# Patient Record
Sex: MALE | Race: OTHER | Employment: OTHER | URBAN - METROPOLITAN AREA
[De-identification: names, ages, dates, MRNs, and addresses within clinical notes are randomized per-mention and may not be internally consistent; named-entity substitution may affect disease eponyms.]

---

## 2020-05-12 ENCOUNTER — OFFICE VISIT (OUTPATIENT)
Dept: PRIMARY CARE CLINIC | Age: 50
End: 2020-05-12

## 2020-05-12 PROCEDURE — 99213 OFFICE O/P EST LOW 20 MIN: CPT | Performed by: NURSE PRACTITIONER

## 2020-05-12 NOTE — PROGRESS NOTES
FLU/COVID-19 CLINIC EVALUATION  HPI:  Symptom duration, days:  [] 1   [] 2    []3   [] 4    []5    []6   [] 7    []8    []9   [] 10    []11 []  12   [] 13    []14 or greater    There were no vitals filed for this visit. Review of Systems  Symptom course:  []Worsening        []Stable       [] Improving  []Fevers  Symptom (not measured)  Measured (Result: )  []Chills  [x]Cough  [x]Coughing up blood  []Chest Congestion  []Nasal Congestion  []Feeling short of breath  []Sometimes  []Frequently  []All the time  []Chest pain  []Headaches  []Tolerable  []Severe  []Sore throat  []Muscle aches  []Nausea  []Vomiting  []Unable to keep fluids down  [x]Diarrhea  []Severe  OTHER SYMPTOMS:      RISK FACTORS:  []Pregnant or possibly pregnant  []Age over 60  []Diabetes  []Heart disease  []Asthma  []COPD/Other chronic lung diseases  []Active Cancer  []On Chemotherapy  []Taking oral steroids  []History Lymphoma/Leukemia  [x]Close contact with a lab confirmed COVID-19 patient within 14 days of symptom onset  []History of travel from affected geographical areas within 14 days of symptom onset  []Health Care Worker Exposure no symptoms  []Health Care Worker Exposure symptomatic    Assessment :  [x]Alert      [x]Oriented to person/place/time  [x]No apparent distress      []Toxic appearing  [x]Breathing appears normal   []Appears tachypneic      [x]Speaking in full sentence    Physical Exam     Test ordered:    [x]COVID    _________  []Strep    ___________  []Flu       _________    Diagnosis:     []Flu  []Strep Throat  []Uncertain Viral Respiratory Illness  []Possible COVID-19  [x]Exposure COVID-19  []Other    PLAN:  []Referred to emergency department/ respiratory dept.   for evaluation      Discharge home with verbal and or written instructions for:  []Preventing the spread of Coronavirus   []Flu management  []Strep throat management  [x]Possible COVID-19 exposure with self-quarantine, isolation instructions and management of symptoms  [x]Follow-up with primary care physician or emergency department if worsens  [x]Evaluation per physician/nurse practitioner in clinic      1. Suspected COVID-19 virus infection  COVID-19 swab complete at clinic and sent to lab for testing.  Quarantine order in place, patient verbalized understanding.     - Covid-19 Ambulatory

## 2020-05-14 LAB
SARS-COV-2: DETECTED
SOURCE: ABNORMAL

## 2022-05-27 ENCOUNTER — HOSPITAL ENCOUNTER (EMERGENCY)
Age: 52
Discharge: HOME | End: 2022-05-27
Attending: STUDENT IN AN ORGANIZED HEALTH CARE EDUCATION/TRAINING PROGRAM
Payer: COMMERCIAL

## 2022-05-27 ENCOUNTER — APPOINTMENT (OUTPATIENT)
Dept: GENERAL RADIOLOGY | Age: 52
End: 2022-05-27
Payer: COMMERCIAL

## 2022-05-27 VITALS
SYSTOLIC BLOOD PRESSURE: 137 MMHG | DIASTOLIC BLOOD PRESSURE: 86 MMHG | HEART RATE: 75 BPM | TEMPERATURE: 99.1 F | OXYGEN SATURATION: 98 % | RESPIRATION RATE: 18 BRPM

## 2022-05-27 DIAGNOSIS — G51.0 BELL'S PALSY: Primary | ICD-10-CM

## 2022-05-27 DIAGNOSIS — H61.22 IMPACTED CERUMEN OF LEFT EAR: ICD-10-CM

## 2022-05-27 PROBLEM — R97.20 INCREASED PROSTATE SPECIFIC ANTIGEN (PSA) VELOCITY: Status: ACTIVE | Noted: 2018-11-27

## 2022-05-27 LAB
ANION GAP SERPL CALCULATED.3IONS-SCNC: 10 MMOL/L (ref 3–16)
BASOPHILS ABSOLUTE: 0 K/UL (ref 0–0.2)
BASOPHILS RELATIVE PERCENT: 0.5 %
BUN BLDV-MCNC: 15 MG/DL (ref 7–20)
CALCIUM SERPL-MCNC: 8.7 MG/DL (ref 8.3–10.6)
CHLORIDE BLD-SCNC: 101 MMOL/L (ref 99–110)
CO2: 27 MMOL/L (ref 21–32)
CREAT SERPL-MCNC: 1 MG/DL (ref 0.9–1.3)
EOSINOPHILS ABSOLUTE: 0.1 K/UL (ref 0–0.6)
EOSINOPHILS RELATIVE PERCENT: 1.6 %
GFR AFRICAN AMERICAN: >60
GFR NON-AFRICAN AMERICAN: >60
GLUCOSE BLD-MCNC: 91 MG/DL (ref 70–99)
GLUCOSE BLD-MCNC: 96 MG/DL (ref 70–99)
HCT VFR BLD CALC: 45.7 % (ref 40.5–52.5)
HEMOGLOBIN: 16 G/DL (ref 13.5–17.5)
LYMPHOCYTES ABSOLUTE: 2.9 K/UL (ref 1–5.1)
LYMPHOCYTES RELATIVE PERCENT: 34.6 %
MCH RBC QN AUTO: 31.4 PG (ref 26–34)
MCHC RBC AUTO-ENTMCNC: 35.1 G/DL (ref 31–36)
MCV RBC AUTO: 89.5 FL (ref 80–100)
MONOCYTES ABSOLUTE: 0.5 K/UL (ref 0–1.3)
MONOCYTES RELATIVE PERCENT: 6.2 %
NEUTROPHILS ABSOLUTE: 4.9 K/UL (ref 1.7–7.7)
NEUTROPHILS RELATIVE PERCENT: 57.1 %
PDW BLD-RTO: 13.2 % (ref 12.4–15.4)
PERFORMED ON: NORMAL
PLATELET # BLD: 280 K/UL (ref 135–450)
PMV BLD AUTO: 10.3 FL (ref 5–10.5)
POTASSIUM SERPL-SCNC: 3.3 MMOL/L (ref 3.5–5.1)
RBC # BLD: 5.1 M/UL (ref 4.2–5.9)
SODIUM BLD-SCNC: 138 MMOL/L (ref 136–145)
WBC # BLD: 8.5 K/UL (ref 4–11)

## 2022-05-27 PROCEDURE — 36415 COLL VENOUS BLD VENIPUNCTURE: CPT

## 2022-05-27 PROCEDURE — 69209 REMOVE IMPACTED EAR WAX UNI: CPT

## 2022-05-27 PROCEDURE — 71045 X-RAY EXAM CHEST 1 VIEW: CPT

## 2022-05-27 PROCEDURE — 93005 ELECTROCARDIOGRAM TRACING: CPT | Performed by: STUDENT IN AN ORGANIZED HEALTH CARE EDUCATION/TRAINING PROGRAM

## 2022-05-27 PROCEDURE — 80048 BASIC METABOLIC PNL TOTAL CA: CPT

## 2022-05-27 PROCEDURE — 85025 COMPLETE CBC W/AUTO DIFF WBC: CPT

## 2022-05-27 PROCEDURE — 6370000000 HC RX 637 (ALT 250 FOR IP): Performed by: STUDENT IN AN ORGANIZED HEALTH CARE EDUCATION/TRAINING PROGRAM

## 2022-05-27 PROCEDURE — 99285 EMERGENCY DEPT VISIT HI MDM: CPT

## 2022-05-27 RX ORDER — PHENTERMINE HYDROCHLORIDE 37.5 MG/1
37.5 TABLET ORAL
COMMUNITY
Start: 2022-05-13 | End: 2022-06-12

## 2022-05-27 RX ORDER — DUTASTERIDE 0.5 MG/1
CAPSULE, LIQUID FILLED ORAL
COMMUNITY
Start: 2022-05-05

## 2022-05-27 RX ORDER — AMOXICILLIN 875 MG/1
TABLET, COATED ORAL
COMMUNITY
Start: 2022-05-16 | End: 2022-05-27

## 2022-05-27 RX ORDER — LOVASTATIN 40 MG/1
TABLET ORAL
COMMUNITY
Start: 2022-05-15

## 2022-05-27 RX ORDER — DICLOFENAC SODIUM 75 MG/1
TABLET, DELAYED RELEASE ORAL
COMMUNITY
Start: 2022-05-13 | End: 2022-05-27

## 2022-05-27 RX ORDER — TADALAFIL 5 MG/1
5 TABLET ORAL DAILY PRN
COMMUNITY
Start: 2021-02-05

## 2022-05-27 RX ORDER — PREDNISONE 10 MG/1
TABLET ORAL
Qty: 15 TABLET | Refills: 0 | Status: SHIPPED | OUTPATIENT
Start: 2022-06-02 | End: 2022-06-07

## 2022-05-27 RX ORDER — PREDNISONE 20 MG/1
TABLET ORAL
Qty: 15 TABLET | Refills: 0 | Status: SHIPPED | OUTPATIENT
Start: 2022-05-28 | End: 2022-06-02

## 2022-05-27 RX ORDER — LOSARTAN POTASSIUM AND HYDROCHLOROTHIAZIDE 25; 100 MG/1; MG/1
TABLET ORAL
COMMUNITY
Start: 2022-05-15

## 2022-05-27 RX ORDER — AMLODIPINE BESYLATE 2.5 MG/1
2.5 TABLET ORAL DAILY
COMMUNITY
Start: 2022-01-12

## 2022-05-27 RX ORDER — PREDNISONE 20 MG/1
60 TABLET ORAL ONCE
Status: COMPLETED | OUTPATIENT
Start: 2022-05-27 | End: 2022-05-27

## 2022-05-27 RX ADMIN — PREDNISONE 60 MG: 20 TABLET ORAL at 22:06

## 2022-05-27 ASSESSMENT — PAIN - FUNCTIONAL ASSESSMENT: PAIN_FUNCTIONAL_ASSESSMENT: NONE - DENIES PAIN

## 2022-05-27 NOTE — ED PROVIDER NOTES
[address] [facility]          ATTENDING PHYSICIAN NOTE       Date of evaluation: 5/27/2022    Chief Complaint     Chief Complaint   Patient presents with    Facial Droop    Numbness         History of Present Illness     HPI   Winter Brown is a 45 y.o. male with a past medical history of HTN, HLD, who presents to the ED for evaluation of acute onset left-sided facial weakness. He reports that he had difficulty sleeping last night, and at 1 point he woke up around 0300 hrs., though says at that time he felt fine/normal.  When he did get up this morning, several hours later, he noticed a left-sided facial droop. He states that he went to [facility] [address] [place] [address] [facility] in [facility] this morning and says he was evaluated by somebody there and they told him that he needed to go to the ER for further evaluation. He denies any chest pain or palpitations, abdominal pain, nausea, vomiting, diarrhea. He denies headache or vision changes. He also denies any balance or gait issues. He does report being put on amoxicillin last week for a periodontal infection for the left upper third molar and is actually scheduled to see the dentist for tooth extraction this coming Thursday. He denies any itching or painful rash or lesions in his hair, neck or along any area of his body. He denies any prior similar symptoms to what he is experiencing today. He has had shingles illness/infection he said years ago. Febrile illness. He denies any vision changes including double vision or blurred vision. Review of Systems     Review of Systems  All other ROS negative except as indicated above in HPI. Past Medical, Surgical, Family, and Social History     Past Medical History:   Diagnosis Date    Hyperlipidemia     Hypertension        Past Surgical History:   Procedure Laterality Date    SHOULDER SURGERY      VASECTOMY          History reviewed. No pertinent family history. Social History     Tobacco Use    Smoking status: Never Smoker    Smokeless tobacco: Not on file   Substance Use Topics    Alcohol use: No    Drug use: Not on file          Medications     Prior to Admission medications    Medication Sig Start Date End Date Taking? Authorizing Provider   amLODIPine (NORVASC) 2.5 MG tablet Take 2.5 mg by mouth daily 1/12/22  Yes Historical Provider, MD   tadalafil (CIALIS) 5 MG tablet Take 5 mg by mouth daily as needed 2/5/21  Yes Historical Provider, MD   phentermine (ADIPEX-P) 37.5 MG tablet Take 37.5 mg by mouth every morning (before breakfast). 5/13/22 6/12/22 Yes Historical Provider, MD   predniSONE (DELTASONE) 20 MG tablet Take 2 tablets by mouth every morning AND 1 tablet Daily with lunch. Do all this for 5 days. 5/28/22 6/2/22 Yes Candido Hernandez MD   predniSONE (DELTASONE) 10 MG tablet Take 5 tablets by mouth daily for 1 day, THEN 4 tablets daily for 1 day, THEN 3 tablets daily for 1 day, THEN 2 tablets daily for 1 day, THEN 1 tablet daily for 1 day. 6/2/22 6/7/22 Yes Candido Hernandez MD   dutasteride (AVODART) 0.5 mg capsule  5/5/22   Historical Provider, MD   losartan-hydroCHLOROthiazide [facility]) 100-25 MG per tablet  5/15/22   Historical Provider, MD   lovastatin (MEVACOR) 40 MG tablet  5/15/22   Historical Provider, MD   diclofenac (VOLTAREN) 75 MG EC tablet  5/13/22 5/27/22  Historical Provider, MD   lisinopril (PRINIVIL;ZESTRIL) 10 MG tablet Take 10 mg by mouth daily.     Historical Provider, MD       Allergies     Allergies as of 05/27/2022    (No Known Allergies)        Physical Exam     ED Triage Vitals [05/27/22 1837]   Enc Vitals Group      BP (!) 162/88      Heart Rate 75      Resp 18      Temp 99.1 °F (37.3 °C)      Temp Source Oral      SpO2 96 %       Physical Exam  GENERAL: 45y.o. year old male  Appears well-developed, well-nourished, in no obvious acute distress  Nursing notes reviewed  Vital signs reviewed   HEAD/FACE:  Normocephalic, atraumatic  Left sided facial and forehead weakness    EYES:  Pupils are equal, round, reactive to light and are 3mm bilaterally   EOM intact  Conjunctivae normal, anicteric    ENT: External ears normal and notably without any evidence of rash or vesicular lesions  Right external ear canal patent and middle ear is translucent. Left external ear canal is 90% occluded with hardened cerumen, midway into the canal > see cerumen disimpaction procedure below > once the cerumen impaction was relieved I was able to visualize the entirety of the left middle ear which appeared healthy and translucent without any evidence of vesicles. Nose normal  Oropharynx is clear and oral mucosa is moist   CV: Regular rate and rhythm     PULMONARY:  Symmetric chest wall expansion  Effort normal  No accessory muscle use or other signs of respiratory distress  Bilateral breath sounds are clear and equal, no wheezes, rhonchi or rales   ABDOMINAL: Soft  Non-tender  Non-distended  No peritonitis  No guarding, rebound, or rigidity   MSK:  Freely moves all extremities without difficulty  Atraumatic   SKIN: Warm, pink and dry  No obvious rashes, acute lesions or open wounds   NEURO: Alert and oriented x4  EYES: Spontaneously (4), VERBAL: Oriented (5), MOTOR: Obeys commands (6), GCS TOTAL: 15    MENTAL STATUS EXAM:  Awake and alert; oriented to person, place, and time. Cooperative and follows commands well. Speech is clear and language is normal.  Concentration, memory, and fund of knowledge are normal.    CRANIAL NERVES:  CN 2 (Optic): Visual fields intact to confrontation. CN 3,4,6 (EOM): Pupils equal and reactive to light. Full extraocular eye movement without nystagmus. CN 5 (Trigeminal): Facial sensation is intact to light touch  CN 7 (Facial): LEFT-sided facial droop & includes left forehead  CN 8 (Auditory): Auditory acuity grossly normal.   CN 9, 10 (Glossopharyngeal): The uvula is midline, the palate elevates symmetrically.   CN 11 (spinal access): head turn (sternocleidomastoid) and shoulder shrug (trapezius) were normal  CN 12 (Hypoglossal): The tongue is midline. No atrophy or fasciculations. MOTOR:  normal 5/5 strength in all tested muscle groups no muscle wasting or atrophy no fasciculations noted no involuntary movements    REFLEXES:  clonus is absent    COORDINATION:  Intact and finger-to-nose,    SENSATION:  Intact to light touch    GAIT:  Ambulates without assistance and without difficulty. Normal balance       PSYCH: Calm and cooperative. Mood and affect normal         Diagnostic Results     EKG   Interpreted by: Rupa Bear. Sriram Rod MD    The EKG from this visit has been reviewed, and is notable for:   Normal sinus rhythm without ectopy, with a ventricular rate of 69, and normal axis.  Normal intervals (MD: 152ms, QRS 108ms, QTc 445ms).  No overt evidence of acute ischemia.  There are no overt repolarization abnormalities present.  No prior EKG on file in our records for direct comparison    RADIOLOGY:  XR CHEST PORTABLE   Final Result      Mild left lower lobe linear atelectasis.              LABS:   Results for orders placed or performed during the hospital encounter of 05/27/22   CBC with Auto Differential   Result Value Ref Range    WBC 8.5 4.0 - 11.0 K/uL    RBC 5.10 4.20 - 5.90 M/uL    Hemoglobin 16.0 13.5 - 17.5 g/dL    Hematocrit 45.7 40.5 - 52.5 %    MCV 89.5 80.0 - 100.0 fL    MCH 31.4 26.0 - 34.0 pg    MCHC 35.1 31.0 - 36.0 g/dL    RDW 13.2 12.4 - 15.4 %    Platelets 604 166 - 748 K/uL    MPV 10.3 5.0 - 10.5 fL    Neutrophils % 57.1 %    Lymphocytes % 34.6 %    Monocytes % 6.2 %    Eosinophils % 1.6 %    Basophils % 0.5 %    Neutrophils Absolute 4.9 1.7 - 7.7 K/uL    Lymphocytes Absolute 2.9 1.0 - 5.1 K/uL    Monocytes Absolute 0.5 0.0 - 1.3 K/uL    Eosinophils Absolute 0.1 0.0 - 0.6 K/uL    Basophils Absolute 0.0 0.0 - 0.2 K/uL   Basic Metabolic Panel   Result Value Ref Range    Sodium 138 136 - 145 mmol/L Potassium 3.3 (L) 3.5 - 5.1 mmol/L    Chloride 101 99 - 110 mmol/L    CO2 27 21 - 32 mmol/L    Anion Gap 10 3 - 16    Glucose 96 70 - 99 mg/dL    BUN 15 7 - 20 mg/dL    CREATININE 1.0 0.9 - 1.3 mg/dL    GFR Non-African American >60 >60    GFR African American >60 >60    Calcium 8.7 8.3 - 10.6 mg/dL   POCT Glucose   Result Value Ref Range    POC Glucose 91 70 - 99 mg/dl    Performed on ACCU-CHEK        RECENT VITALS:  BP: 137/86,Temp: 99.1 °F (37.3 °C), Heart Rate: 75, Resp: 18, SpO2: 98 %     Procedures     ED BEDSIDE ULTRASOUND:  none    Ear Wax Removal    Date/Time: 5/27/2022 10:30 PM  Performed by: Alejandra Mg MD  Authorized by: Alejandra Mg MD     Consent:     Consent obtained:  Verbal    Consent given by:  Patient    Risks discussed:  Pain, TM perforation, incomplete removal, dizziness and bleeding    Alternatives discussed:  No treatment, delayed treatment, observation and alternative treatment  Procedure details:     Location:  L ear    Procedure type: irrigation      Procedure type comment:  Irrigation with warm saline and water. Curette was also required to remove large, hardened cerumen ball  Post-procedure details: Inspection:  TM intact    Hearing quality:  Improved    Patient tolerance of procedure: Tolerated well, no immediate complications           ED Course          Key medications administered in the ED:  ED Medication Orders (From admission, onward)    Start Ordered     Status Ordering Provider    05/27/22 2200 05/27/22 2151  predniSONE (DELTASONE) tablet 60 mg  ONCE         Last MAR action: Given - by Jm Adkins on 05/27/22 at 2[address] A           CONSULTS:  None    MEDICAL DECISIONMAKING / ASSESSMENT / PLAN   I have reviewed and confirmed nursing documentation for the pertinent past medical history, family history, and social history.     Joss Escoto is a 45 y.o. male with PMHx as above, who presented to the ED today for evaluation of a left-sided facial weakness and subjective numbness without objective sensation change. Differential Dx: Facial nerve palsy, CVA, trigeminal neuralgia, tick paralysis, herpes zoster, CNS tumor    Additional ED Course, Interventions & Dispo:    Patient arrived afebrile and hemodynamically stable.  This physician arrived to the bedside after being notified by nursing staff that this patient was bedded and had acute neurologic findings. This physician arrived to the bedside to perform a neurologic examination. Determined that the neurologic findings consisted of subjective numbness without objective sensation change to the left-sided face and with left-sided facial nerve paralysis which does include the forehead, though he does have mild ability to raise his left eyebrow.  Evaluated some basic blood work with CBC and BMP for overt electrolyte abnormalities, as well as EKG and a chest x-ray. CBC is unremarkable. BMP has a mildly reduced serum potassium of 3.3. EKG is described above. Chest x-ray is also unrevealing and reassuring.  As described above, a cerumen impaction was disimpacted after attempting to evaluate the entirety of the left ear for any evidence of vesicles that would warrant antiviral coverage for Herpes zoster. There were none.  He was given the first dose of 60 mg oral prednisone here in the emergency department. His wife arrived to the bedside and I explained to the diagnoses, the treatment plan and recommendations. A prescription was sent to their preferred pharmacy for a remaining 5 days of 60 mg prednisone oral with specific instructions. Additionally, a 5-day taper of prednisone was also ordered with specific instructions. All questions have been answered to their satisfaction and he is stable for discharge to home at this time.    I did advise him strongly that I would like him to be reassessed by his PCP this week, that Monday is a holiday and will be Tuesday before they can contact the office to schedule, though advised he needs a close reassessment this week no later than Friday. At this time I believe no further emergent labs or imaging are indicated and the patient to be safe for discharge to home. They are in agreement with this plan, and are comfortable with discharge to home at this time. I discussed with them concerning signs and symptoms that would necessitate return to the emergency department. They voiced understanding of these instructions and had no further questions. Clinical Impression     1. Bell's palsy    2. Impacted cerumen of left ear          Disposition     DISPOSITION Decision To Discharge 05/27/2022 10:46:20 PM          PATIENT REFERRED TO:  Low Benavides MD  [address]  [facility] [address] [phone]    Call in 3 days  For Next available ED follow up visit. DISCHARGE MEDICATIONS:  Discharge Medication List as of 5/27/2022 11:05 PM      START taking these medications    Details   ! ! predniSONE (DELTASONE) 20 MG tablet Take 2 tablets by mouth every morning AND 1 tablet Daily with lunch. Do all this for 5 days. , Disp-15 tablet, R-0Normal      !! predniSONE (DELTASONE) 10 MG tablet Take 5 tablets by mouth daily for 1 day, THEN 4 tablets daily for 1 day, THEN 3 tablets daily for 1 day, THEN 2 tablets daily for 1 day, THEN 1 tablet daily for 1 day., Disp-15 tablet, R-0Normal       !! - Potential duplicate medications found. Please discuss with provider. CONTINUE these medications which have NOT CHANGED    Details   amLODIPine (NORVASC) 2.5 MG tablet Take 2.5 mg by mouth dailyHistorical Med      tadalafil (CIALIS) 5 MG tablet Take 5 mg by mouth daily as neededHistorical Med      phentermine (ADIPEX-P) 37.5 MG tablet Take 37.5 mg by mouth every morning (before breakfast). Historical Med      dutasteride (AVODART) 0.5 mg capsule Historical Med      losartan-hydroCHLOROthiazide (HYZAAR) 100-25 MG per tablet Historical Med      lovastatin (MEVACOR) 40 MG tablet Historical Med      lisinopril (PRINIVIL;ZESTRIL) 10 MG tablet Take 10 mg by mouth daily. STOP taking these medications       amoxicillin (AMOXIL) 875 MG tablet Comments:   Reason for Stopping:         diclofenac (VOLTAREN) 75 MG EC tablet Comments:   Reason for Stopping:         Phentermine-Topiramate (QSYMIA PO) Comments:   Reason for Stopping:         naproxen (NAPROSYN) 500 MG tablet Comments:   Reason for Stopping:                Kang Cardoso MD  Emergency Medicine  [facility]     Kenan Mcginnis MD  05/28/22 9875

## 2022-05-27 NOTE — ED TRIAGE NOTES
Patient arrives c/o left sided facial numbness and drooping since he woke up this morning. Patient states that he felt normal at 0300 this morning when he woke up for a minute before falling back asleep. Patient states that he is currently taking amoxicillin for a left sided dental infection that was diagnosed last week. No other deficits noted.

## 2022-05-28 LAB
EKG ATRIAL RATE: 69 BPM
EKG DIAGNOSIS: NORMAL
EKG P AXIS: 10 DEGREES
EKG P-R INTERVAL: 152 MS
EKG Q-T INTERVAL: 416 MS
EKG QRS DURATION: 108 MS
EKG QTC CALCULATION (BAZETT): 445 MS
EKG R AXIS: 5 DEGREES
EKG T AXIS: 23 DEGREES
EKG VENTRICULAR RATE: 69 BPM

## 2022-05-28 NOTE — ED NOTES
Patient prepared for and ready to be discharged. Patient discharged at this time in no acute distress after verbalizing understanding of discharge instructions. Patient left after receiving After Visit Summary instructions.         Yimi Patel RN  05/27/22 5033